# Patient Record
Sex: FEMALE | Race: WHITE | NOT HISPANIC OR LATINO | Employment: FULL TIME | URBAN - METROPOLITAN AREA
[De-identification: names, ages, dates, MRNs, and addresses within clinical notes are randomized per-mention and may not be internally consistent; named-entity substitution may affect disease eponyms.]

---

## 2022-01-03 ENCOUNTER — OFFICE VISIT (OUTPATIENT)
Dept: NEUROLOGY | Facility: CLINIC | Age: 37
End: 2022-01-03

## 2022-01-03 VITALS
HEART RATE: 93 BPM | DIASTOLIC BLOOD PRESSURE: 70 MMHG | BODY MASS INDEX: 24.29 KG/M2 | WEIGHT: 137.1 LBS | HEIGHT: 63 IN | SYSTOLIC BLOOD PRESSURE: 115 MMHG

## 2022-01-03 DIAGNOSIS — G50.0 TRIGEMINAL NEURALGIA: ICD-10-CM

## 2022-01-03 DIAGNOSIS — G43.019 INTRACTABLE MIGRAINE WITHOUT AURA AND WITHOUT STATUS MIGRAINOSUS: Primary | ICD-10-CM

## 2022-01-03 PROCEDURE — 96372 THER/PROPH/DIAG INJ SC/IM: CPT | Performed by: NURSE PRACTITIONER

## 2022-01-03 PROCEDURE — 99204 OFFICE O/P NEW MOD 45 MIN: CPT | Performed by: NURSE PRACTITIONER

## 2022-01-03 RX ORDER — HYDROXYZINE HYDROCHLORIDE 10 MG/1
10 TABLET, FILM COATED ORAL AS NEEDED
COMMUNITY

## 2022-01-03 RX ORDER — FREMANEZUMAB-VFRM 225 MG/1.5ML
225 INJECTION SUBCUTANEOUS
Qty: 1 PEN | Refills: 3 | Status: SHIPPED | OUTPATIENT
Start: 2022-01-03

## 2022-01-03 RX ORDER — ALPRAZOLAM 0.25 MG/1
1 TABLET ORAL AS NEEDED
COMMUNITY

## 2022-01-03 RX ORDER — BUPROPION HYDROCHLORIDE 100 MG/1
TABLET, EXTENDED RELEASE ORAL EVERY 12 HOURS SCHEDULED
COMMUNITY

## 2022-01-03 RX ORDER — PROMETHAZINE HYDROCHLORIDE 25 MG/1
1 TABLET ORAL AS NEEDED
COMMUNITY
Start: 2021-07-22

## 2022-01-03 RX ORDER — BUSPIRONE HYDROCHLORIDE 10 MG/1
10 TABLET ORAL DAILY
COMMUNITY
Start: 2021-07-22

## 2022-01-03 RX ORDER — FEXOFENADINE HCL 180 MG/1
180 TABLET ORAL DAILY
COMMUNITY
Start: 2021-07-22

## 2022-01-03 RX ORDER — CHOLESTYRAMINE 4 G/9G
4 POWDER, FOR SUSPENSION ORAL DAILY
COMMUNITY
Start: 2021-07-22

## 2022-01-03 RX ORDER — BUTALBITAL, ACETAMINOPHEN AND CAFFEINE 50; 325; 40 MG/1; MG/1; MG/1
1 TABLET ORAL AS NEEDED
COMMUNITY

## 2022-01-03 NOTE — PROGRESS NOTES
"Chief Complaint  Neurologic Problem and Migraine    Subjective          Kelli High presents to [facility]  She reports that she developed headaches many years ago. Since that time, her headaches have progressively worsened.   Currently, she reports headaches that are located left frontal. She characterizes the headaches as 7/10 in severity, stabbing in nature with associated photophobia, phonophobia and nausea. She reports headaches last 12+ hours. She reports 25 headache days per month. She denies associated aura. She denies focal numbness, weakness, speech and vision changes.   Triggers: stress, odors and menstrual cycle   Symptoms improved by: Dark quiet room and Sleep   She states she is sleeping fairly well. Reports getting 7-8 hours of sleep per night. Endorses snoring. Reports unrefreshing sleep.   Prior prophylactic medications include: topiramate, amitryptyline, propranolol, Emgality,   She  uses abortive therapy such as: imitrex, maxalt, zomig,   Caffeine Use: 3 servings daily  Childbearing potential : tubal ligation   History of Kidney Stones: No  She denies a family history of cerebral aneurysm.       Objective   Vital Signs:   /70   Pulse 93   Ht 160 cm (63\")   Wt 62.2 kg (137 lb 1.6 oz)   BMI 24.29 kg/m²     Physical Exam  HENT:      Head: Normocephalic.   Pulmonary:      Effort: Pulmonary effort is normal.   Neurological:      Mental Status: She is alert and oriented to person, place, and time.      Cranial Nerves: Cranial nerves are intact.      Sensory: Sensation is intact.      Motor: Motor function is intact.      Coordination: Coordination is intact.      Deep Tendon Reflexes: Reflexes are normal and symmetric.        Neurologic Exam     Mental Status   Oriented to person, place, and time.        Result Review :               Assessment and Plan    Diagnoses and all orders for this visit:    1. Intractable migraine without aura and without " status migrainosus (Primary)  Assessment & Plan:  Likely migraine presentation with trigeminal nerve spread.  Will order MRI IACs to ensure no lesion on trigeminal nerve.  Will start Ajovy for preventative therapy and Ubrelvy PRN for abortive therapy.  First dose of Ajovy given in the office as a sample today to initiate treatment.       Orders:  -     MRI Internal Auditory Canal With Wo; Future  -     Fremanezumab-vfrm solution auto-injector 225 mg    2. Trigeminal neuralgia  -     MRI Internal Auditory Canal With Wo; Future    Other orders  -     Fremanezumab-vfrm (Ajovy) 225 MG/1.5ML solution auto-injector; Inject 225 mg under the skin into the appropriate area as directed Every 30 (Thirty) Days.  Dispense: 1 pen; Refill: 3  -     ubrogepant (ubrogepant) 100 MG tablet; Take 1 tablet by mouth 1 (One) Time As Needed (migraine) for up to 1 dose. One tablet at HA onset, may repeat once in 2 hours if needed.  Dispense: 10 tablet; Refill: 3      Follow Up   Return in about 2 months (around 3/3/2022) for Migraine f/u.  Patient was given instructions and counseling regarding her condition or for health maintenance advice. Please see specific information pulled into the AVS if appropriate.

## 2022-01-04 NOTE — ASSESSMENT & PLAN NOTE
Likely migraine presentation with trigeminal nerve spread.  Will order MRI IACs to ensure no lesion on trigeminal nerve.  Will start Ajovy for preventative therapy and Ubrelvy PRN for abortive therapy.  First dose of Ajovy given in the office as a sample today to initiate treatment.

## 2022-01-10 ENCOUNTER — PRIOR AUTHORIZATION (OUTPATIENT)
Dept: NEUROLOGY | Facility: CLINIC | Age: 37
End: 2022-01-10

## 2022-01-31 ENCOUNTER — HOSPITAL ENCOUNTER (OUTPATIENT)
Dept: MRI IMAGING | Facility: HOSPITAL | Age: 37
Discharge: HOME | End: 2022-01-31
Admitting: NURSE PRACTITIONER

## 2022-01-31 DIAGNOSIS — G43.019 INTRACTABLE MIGRAINE WITHOUT AURA AND WITHOUT STATUS MIGRAINOSUS: ICD-10-CM

## 2022-01-31 DIAGNOSIS — G50.0 TRIGEMINAL NEURALGIA: ICD-10-CM

## 2022-01-31 PROCEDURE — 70551 MRI BRAIN STEM W/O DYE: CPT

## 2022-02-18 ENCOUNTER — TELEPHONE (OUTPATIENT)
Dept: NEUROLOGY | Facility: CLINIC | Age: 37
End: 2022-02-18

## 2022-02-18 NOTE — TELEPHONE ENCOUNTER
Pt called in asking for results of mri done on 1/31/22 please advise       F/u appt 3/14/22      Call back [phone]

## 2022-05-09 ENCOUNTER — OFFICE VISIT (OUTPATIENT)
Dept: OBSTETRICS AND GYNECOLOGY | Facility: CLINIC | Age: 37
End: 2022-05-09

## 2022-05-09 VITALS
HEIGHT: 63 IN | HEART RATE: 85 BPM | WEIGHT: 135 LBS | DIASTOLIC BLOOD PRESSURE: 75 MMHG | SYSTOLIC BLOOD PRESSURE: 115 MMHG | BODY MASS INDEX: 23.92 KG/M2

## 2022-05-09 DIAGNOSIS — N93.9 ABNORMAL UTERINE BLEEDING (AUB): Primary | ICD-10-CM

## 2022-05-09 LAB
DEPRECATED RDW RBC AUTO: 39 FL (ref 37–54)
ERYTHROCYTE [DISTWIDTH] IN BLOOD BY AUTOMATED COUNT: 11.7 % (ref 12.3–15.4)
HCT VFR BLD AUTO: 39.6 % (ref 34–46.6)
HGB BLD-MCNC: 13 G/DL (ref 12–15.9)
MCH RBC QN AUTO: 30 PG (ref 26.6–33)
MCHC RBC AUTO-ENTMCNC: 32.8 G/DL (ref 31.5–35.7)
MCV RBC AUTO: 91.2 FL (ref 79–97)
PLATELET # BLD AUTO: 230 10*3/MM3 (ref 140–450)
PMV BLD AUTO: 11.1 FL (ref 6–12)
RBC # BLD AUTO: 4.34 10*6/MM3 (ref 3.77–5.28)
WBC NRBC COR # BLD: 6.13 10*3/MM3 (ref 3.4–10.8)

## 2022-05-09 PROCEDURE — 84443 ASSAY THYROID STIM HORMONE: CPT | Performed by: NURSE PRACTITIONER

## 2022-05-09 PROCEDURE — 85027 COMPLETE CBC AUTOMATED: CPT | Performed by: NURSE PRACTITIONER

## 2022-05-09 PROCEDURE — 99213 OFFICE O/P EST LOW 20 MIN: CPT | Performed by: NURSE PRACTITIONER

## 2022-05-09 PROCEDURE — 84439 ASSAY OF FREE THYROXINE: CPT | Performed by: NURSE PRACTITIONER

## 2022-05-09 RX ORDER — VENLAFAXINE HYDROCHLORIDE 37.5 MG/1
CAPSULE, EXTENDED RELEASE ORAL
COMMUNITY

## 2022-05-09 NOTE — PROGRESS NOTES
"GYN Problem/Follow Up Visit    Chief Complaint   Patient presents with   • Follow-up     AUB           HPI  Kelli Grissom is a 36 y.o. female, , who presents for irregular menses, for past 2 months last month 17 day period, light to heavy with clots, on heaviest day change products every 4-6 hours- superplus, Following menses occurred 45 days later and last 11 days. Mild menstrual cramps. 10 pound weightloss last 6 months, eating less    Previous endometrial biopsy 2018 unremarkable proliferative endometrium, unremarkable endocervical glandular epithelium    Hx thyroid nodules.  Normal hormone levels,     Hx PCOS    Additional OB/GYN History   Patient's last menstrual period was 2022.  Current contraception: contraceptive methods: Tubal ligation  Allergies : Azithromycin, Other, and Zolmitriptan     The additional following portions of the patient's history were reviewed and updated as appropriate: allergies, current medications, past family history, past medical history, past social history, past surgical history and problem list.    Review of Systems    I have reviewed and agree with the HPI, ROS, and historical information as entered above. Mary Ann Mckeon, APRN    Objective   /75   Pulse 85   Ht 160 cm (63\")   Wt 61.2 kg (135 lb)   LMP 2022   Breastfeeding No   BMI 23.91 kg/m²     Physical Exam  Vitals and nursing note reviewed. Exam conducted with a chaperone present.   Constitutional:       Appearance: Normal appearance.   Neck:      Thyroid: No thyromegaly.   Cardiovascular:      Rate and Rhythm: Normal rate and regular rhythm.      Heart sounds: Normal heart sounds.   Pulmonary:      Effort: Pulmonary effort is normal.      Breath sounds: Normal breath sounds.   Abdominal:      Palpations: Abdomen is soft.   Genitourinary:     General: Normal vulva.      Vagina: Normal.      Cervix: Lesion present.      Uterus: Tender.       Adnexa: Right adnexa normal and left adnexa normal. "            Comments: Small nabothian cyst 7 o'clock position ectocervix  Lymphadenopathy:      Lower Body: No right inguinal adenopathy. No left inguinal adenopathy.   Skin:     General: Skin is warm and dry.   Neurological:      Mental Status: She is alert and oriented to person, place, and time.            Assessment and Plan    Diagnoses and all orders for this visit:    1. Abnormal uterine bleeding (AUB) (Primary)  -     T4, Free  -     TSH  -     CBC (No Diff)  -     US Non-ob Transvaginal; Future        Counseling:    She understands the importance of having the above orders performed in a timely fashion.  The risks of not performing them include, but are not limited to, cancer and/or subsequent increase in morbidity and/or mortality.  She is encouraged to review her results online and/or contact or office if she has questions.     Follow Up:  Return for after ultrasound.        Mary Ann Mckeon, RICHARD  05/09/2022

## 2022-05-10 DIAGNOSIS — N93.9 ABNORMAL UTERINE BLEEDING (AUB): Primary | ICD-10-CM

## 2022-05-10 LAB
T4 FREE SERPL-MCNC: 1.02 NG/DL (ref 0.93–1.7)
TSH SERPL DL<=0.05 MIU/L-ACNC: 1.57 UIU/ML (ref 0.27–4.2)